# Patient Record
Sex: FEMALE | Race: WHITE | Employment: UNEMPLOYED | URBAN - METROPOLITAN AREA
[De-identification: names, ages, dates, MRNs, and addresses within clinical notes are randomized per-mention and may not be internally consistent; named-entity substitution may affect disease eponyms.]

---

## 2023-01-01 ENCOUNTER — APPOINTMENT (OUTPATIENT)
Dept: GENERAL RADIOLOGY | Age: 33
End: 2023-01-01
Payer: MEDICAID

## 2023-01-01 ENCOUNTER — HOSPITAL ENCOUNTER (EMERGENCY)
Age: 33
Discharge: HOME | End: 2023-01-01
Attending: EMERGENCY MEDICINE
Payer: MEDICAID

## 2023-01-01 VITALS
OXYGEN SATURATION: 100 % | WEIGHT: 257.5 LBS | TEMPERATURE: 97.6 F | RESPIRATION RATE: 18 BRPM | HEART RATE: 98 BPM | SYSTOLIC BLOOD PRESSURE: 129 MMHG | DIASTOLIC BLOOD PRESSURE: 71 MMHG | HEIGHT: 62 IN | BODY MASS INDEX: 47.39 KG/M2

## 2023-01-01 DIAGNOSIS — S60.222A CONTUSION OF LEFT HAND, INITIAL ENCOUNTER: Primary | ICD-10-CM

## 2023-01-01 PROCEDURE — 73110 X-RAY EXAM OF WRIST: CPT

## 2023-01-01 PROCEDURE — 6370000000 HC RX 637 (ALT 250 FOR IP): Performed by: EMERGENCY MEDICINE

## 2023-01-01 PROCEDURE — 73130 X-RAY EXAM OF HAND: CPT

## 2023-01-01 PROCEDURE — 99283 EMERGENCY DEPT VISIT LOW MDM: CPT

## 2023-01-01 RX ORDER — IBUPROFEN 400 MG/1
400 TABLET ORAL ONCE
Status: COMPLETED | OUTPATIENT
Start: 2023-01-01 | End: 2023-01-01

## 2023-01-01 RX ADMIN — IBUPROFEN 400 MG: 400 TABLET, FILM COATED ORAL at 13:09

## 2023-01-01 ASSESSMENT — PAIN SCALES - GENERAL
PAINLEVEL_OUTOF10: 10
PAINLEVEL_OUTOF10: 6

## 2023-01-01 ASSESSMENT — PAIN - FUNCTIONAL ASSESSMENT: PAIN_FUNCTIONAL_ASSESSMENT: 0-10

## 2023-01-01 ASSESSMENT — LIFESTYLE VARIABLES
HOW OFTEN DO YOU HAVE A DRINK CONTAINING ALCOHOL: NEVER
HOW MANY STANDARD DRINKS CONTAINING ALCOHOL DO YOU HAVE ON A TYPICAL DAY: PATIENT DOES NOT DRINK

## 2023-01-01 NOTE — ED TRIAGE NOTES
Pt arrives with complaints of left wrist/hand pain. Mother is unsure how pt injured herself, mother just heard screaming from her room yesterday morning. Mother has applied iced and given pt Advil. Left wrist and hand appears to be red, swollen and painful.

## 2023-01-01 NOTE — ED PROVIDER NOTES
[address]        Pt Name: Kristin Roach  MRN: [id number]  [place] 1990  Date of evaluation: 1/1/2023  Provider: Padmini Gallardo MD  PCP: Supa Scott MD  Note Started: 2:12 PM EST 1/1/23    CHIEF COMPLAINT       Chief Complaint   Patient presents with    Hand Injury     Left hand and wrist. Mother unsure of what caused injury pt was just found screaming in room, mother believes she might have gotten hand stuck in bed frame. HISTORY OF PRESENT ILLNESS: 1 or more Elements     History from : Caregiver    Limitations to history : developmental delay    Kristin Roach is a 29 y.o. female who presents to the emergency department with concerns for hand injury. Patient is unable to provide any past medical history or history of injury due to developmental delay. All history comes from caregiver at bedside. Caregiver states that they were in the other room when the patient screamed out. When they went in the room she was holding her left hand and they noticed that it was swollen with some redness. They believe that she likely caught it on the metal bed frame of her bed as she can lift off the mattress and put her hand in there. Nursing Notes were all reviewed and agreed with or any disagreements were addressed in the HPI. REVIEW OF SYSTEMS :      Review of Systems    Unable to review due to patient's mental status    SURGICAL HISTORY     Past Surgical History:   Procedure Laterality Date    TONSILLECTOMY         CURRENTMEDICATIONS       Previous Medications    CITALOPRAM (CELEXA) 20 MG TABLET    Take 20 mg by mouth daily. LEVOTHYROXINE (SYNTHROID) 25 MCG TABLET    Take 25 mcg by mouth Daily. MIRTAZAPINE (REMERON) 30 MG TABLET    Take 30 mg by mouth nightly. ALLERGIES     Patient has no known allergies. FAMILYHISTORY     History reviewed. No pertinent family history.      SOCIAL HISTORY       Social History Tobacco Use    Smoking status: Never   Substance Use Topics    Alcohol use: No    Drug use: No       SCREENINGS        Ambika Coma Scale  Eye Opening: Spontaneous  Best Verbal Response: Oriented  Best Motor Response: Obeys commands  Baudette Coma Scale Score: 15                CIWA Assessment  BP: 129/71  Heart Rate: 98           PHYSICAL EXAM  1 or more Elements     ED Triage Vitals [01/01/23 1239]   BP Temp Temp Source Heart Rate Resp SpO2 Height Weight   129/71 97.6 °F (36.4 °C) Oral 98 18 100 % 5' 2\" (1.575 m) 257 lb 8 oz (116.8 kg)       Physical Exam    My pulse oximetry interpretation is which is within the normal range    GENERAL APPEARANCE: Awake and alert. Cooperative. No acute distress. HEAD:  Atraumatic. EYES: EOM's grossly intact. ENT: Mucous membranes are moist.  No trismus. NECK:  Trachea midline. EXTREMITIES: Bruising and swelling noted to the dorsal left hand and wrist.  Good radial pulse. Normal sensation in the fingers. Normal range of motion of the fingers. SKIN: Warm and dry. NEUROLOGICAL: Moves all 4 extremities spontaneously. PSYCHIATRIC: Normal mood. DIAGNOSTIC RESULTS   LABS:    Labs Reviewed - No data to display    When ordered only abnormal lab results are displayed. All other labs were within normal range or not returned as of this dictation. EKG:     RADIOLOGY:   Non-plain film images such as CT, Ultrasound and MRI are read by the radiologist. Plain radiographic images are visualized and preliminarily interpreted by the ED Provider with the below findings:        Interpretation per the Radiologist below, if available at the time of this note:    XR WRIST LEFT (MIN 3 VIEWS)   Final Result   Severe soft tissue swelling along the dorsum of the left hand and wrist with   no radiographic evidence of fracture.          XR HAND LEFT (MIN 3 VIEWS)   Final Result   Severe soft tissue swelling along the dorsum of the left hand and wrist with   no radiographic evidence of fracture. No results found. No results found. PROCEDURES   Unless otherwise noted below, none     Procedures    CRITICAL CARE TIME       EMERGENCY DEPARTMENT COURSE and DIFFERENTIAL DIAGNOSIS/MDM:   Vitals:    Vitals:    01/01/23 1239   BP: 129/71   Pulse: 98   Resp: 18   Temp: 97.6 °F (36.4 °C)   TempSrc: Oral   SpO2: 100%   Weight: 257 lb 8 oz (116.8 kg)   Height: 5' 2\" (1.575 m)       Patient was given the following medications:  Medications   ibuprofen (ADVIL;MOTRIN) tablet 400 mg (400 mg Oral Given 1/1/23 1309)             Is this patient to be included in the SEP-1 Core Measure due to severe sepsis or septic shock? No   Exclusion criteria - the patient is NOT to be included for SEP-1 Core Measure due to: Infection is not suspected    PAST MEDICAL HISTORY      has a past medical history of Abscess and Down syndrome. CC/HPI Summary, DDx, ED Course, and Reassessment:   Darion Davis is a 29 y.o. female who presents to the emergency department with concerns for hand injury. Patient is unable to provide any past medical history or history of injury due to developmental delay. All history comes from caregiver at bedside. Caregiver states that they were in the other room when the patient screamed out. When they went in the room she was holding her left hand and they noticed that it was swollen with some redness. They believe that she likely caught it on the metal bed frame of her bed as she can lift off the mattress and put her hand in there. Patient given 400 mg of oral Motrin as caregiver states she takes this typically. X-ray shows soft tissue swelling but no evidence of fracture. Patient given a splint to use discomfort. Caregiver instructed on anti-inflammatory medication and ice as needed and will follow-up with PCP if symptoms continue.     CONSULTS: (Who and What was discussed)  None    Discussion with Other Profesionals : None    Social Determinants : None and patient with developmental delay    Chronic Conditions: Down syndrome    Records Reviewed : None    Disposition Considerations (include 1 Tests not done, Shared Decision Making, Pt Expectation of Test or Tx.): Wrist fracture, hand fracture, compartment syndrome, cellulitis  Appropriate for outpatient management yes      I am the Primary Clinician of Record. FINAL IMPRESSION      1.  Contusion of left hand, initial encounter          DISPOSITION/PLAN     DISPOSITION Decision To Discharge 01/01/2023 03:05:20 PM      PATIENT REFERRED TO:  Karla Huizar MD  [facility]  [address] [facility]  [phone]    Schedule an appointment as soon as possible for a visit   If symptoms worsen    DISCHARGE MEDICATIONS:  New Prescriptions    No medications on file       DISCONTINUED MEDICATIONS:  Discontinued Medications    No medications on file              (Please note that portions of this note were completed with a voice recognition program.  Efforts were made to edit the dictations but occasionally words are mis-transcribed.)    Justina Valenzuela MD (electronically signed)              Donis Boas, MD  01/01/23 0283